# Patient Record
Sex: FEMALE | Race: WHITE
[De-identification: names, ages, dates, MRNs, and addresses within clinical notes are randomized per-mention and may not be internally consistent; named-entity substitution may affect disease eponyms.]

---

## 2017-08-04 ENCOUNTER — HOSPITAL ENCOUNTER (EMERGENCY)
Dept: HOSPITAL 8 - ED | Age: 37
Discharge: HOME | End: 2017-08-04
Payer: COMMERCIAL

## 2017-08-04 VITALS — DIASTOLIC BLOOD PRESSURE: 66 MMHG | SYSTOLIC BLOOD PRESSURE: 106 MMHG

## 2017-08-04 VITALS — BODY MASS INDEX: 20.78 KG/M2 | HEIGHT: 60 IN | WEIGHT: 105.82 LBS

## 2017-08-04 DIAGNOSIS — O20.0: Primary | ICD-10-CM

## 2017-08-04 DIAGNOSIS — Z3A.15: ICD-10-CM

## 2017-08-04 LAB
AST SERPL-CCNC: 15 U/L (ref 15–37)
BUN SERPL-MCNC: 7 MG/DL (ref 7–18)

## 2017-08-04 PROCEDURE — 84702 CHORIONIC GONADOTROPIN TEST: CPT

## 2017-08-04 PROCEDURE — 99285 EMERGENCY DEPT VISIT HI MDM: CPT

## 2017-08-04 PROCEDURE — 80053 COMPREHEN METABOLIC PANEL: CPT

## 2017-08-04 PROCEDURE — 85025 COMPLETE CBC W/AUTO DIFF WBC: CPT

## 2017-08-04 PROCEDURE — 86901 BLOOD TYPING SEROLOGIC RH(D): CPT

## 2017-08-04 PROCEDURE — 36415 COLL VENOUS BLD VENIPUNCTURE: CPT

## 2017-08-04 PROCEDURE — 76805 OB US >/= 14 WKS SNGL FETUS: CPT

## 2017-08-04 PROCEDURE — 81003 URINALYSIS AUTO W/O SCOPE: CPT

## 2018-01-18 ENCOUNTER — HOSPITAL ENCOUNTER (INPATIENT)
Dept: HOSPITAL 8 - LDOP | Age: 38
LOS: 2 days | Discharge: HOME | End: 2018-01-20
Attending: OBSTETRICS & GYNECOLOGY | Admitting: OBSTETRICS & GYNECOLOGY
Payer: COMMERCIAL

## 2018-01-18 VITALS — SYSTOLIC BLOOD PRESSURE: 112 MMHG | DIASTOLIC BLOOD PRESSURE: 70 MMHG

## 2018-01-18 VITALS — SYSTOLIC BLOOD PRESSURE: 127 MMHG | DIASTOLIC BLOOD PRESSURE: 77 MMHG

## 2018-01-18 VITALS — SYSTOLIC BLOOD PRESSURE: 120 MMHG | DIASTOLIC BLOOD PRESSURE: 77 MMHG

## 2018-01-18 VITALS — WEIGHT: 127.01 LBS | HEIGHT: 60 IN | BODY MASS INDEX: 24.94 KG/M2

## 2018-01-18 VITALS — DIASTOLIC BLOOD PRESSURE: 63 MMHG | SYSTOLIC BLOOD PRESSURE: 103 MMHG

## 2018-01-18 DIAGNOSIS — Z91.013: ICD-10-CM

## 2018-01-18 DIAGNOSIS — Z82.49: ICD-10-CM

## 2018-01-18 DIAGNOSIS — O42.92: Primary | ICD-10-CM

## 2018-01-18 DIAGNOSIS — Z3A.38: ICD-10-CM

## 2018-01-18 DIAGNOSIS — Z88.1: ICD-10-CM

## 2018-01-18 DIAGNOSIS — Z83.3: ICD-10-CM

## 2018-01-18 DIAGNOSIS — Z82.62: ICD-10-CM

## 2018-01-18 LAB
BASOPHILS # BLD AUTO: 0.03 X10^3/UL (ref 0–0.1)
BASOPHILS NFR BLD AUTO: 0 % (ref 0–1)
CULTURE INDICATED?: YES
EOSINOPHIL # BLD AUTO: 0.04 X10^3/UL (ref 0–0.4)
EOSINOPHIL NFR BLD AUTO: 0 % (ref 1–7)
ERYTHROCYTE [DISTWIDTH] IN BLOOD BY AUTOMATED COUNT: 13.9 % (ref 9.6–15.2)
LYMPHOCYTES # BLD AUTO: 1.86 X10^3/UL (ref 1–3.4)
LYMPHOCYTES NFR BLD AUTO: 15 % (ref 22–44)
MCH RBC QN AUTO: 31.6 PG (ref 27–34.8)
MCHC RBC AUTO-ENTMCNC: 33.5 G/DL (ref 32.4–35.8)
MCV RBC AUTO: 94.1 FL (ref 80–100)
MD: NO
MICROSCOPIC: (no result)
MONOCYTES # BLD AUTO: 0.46 X10^3/UL (ref 0.2–0.8)
MONOCYTES NFR BLD AUTO: 4 % (ref 2–9)
NEUTROPHILS # BLD AUTO: 10.16 X10^3/UL (ref 1.8–6.8)
NEUTROPHILS NFR BLD AUTO: 81 % (ref 42–75)
PLATELET # BLD AUTO: 181 X10^3/UL (ref 130–400)
PMV BLD AUTO: 9.4 FL (ref 7.4–10.4)
RBC # BLD AUTO: 4.04 X10^6/UL (ref 3.82–5.3)

## 2018-01-18 PROCEDURE — 86900 BLOOD TYPING SEROLOGIC ABO: CPT

## 2018-01-18 PROCEDURE — 00HU33Z INSERTION OF INFUSION DEVICE INTO SPINAL CANAL, PERCUTANEOUS APPROACH: ICD-10-PCS | Performed by: OBSTETRICS & GYNECOLOGY

## 2018-01-18 PROCEDURE — 36415 COLL VENOUS BLD VENIPUNCTURE: CPT

## 2018-01-18 PROCEDURE — 86850 RBC ANTIBODY SCREEN: CPT

## 2018-01-18 PROCEDURE — 0UQMXZZ REPAIR VULVA, EXTERNAL APPROACH: ICD-10-PCS | Performed by: OBSTETRICS & GYNECOLOGY

## 2018-01-18 PROCEDURE — 82803 BLOOD GASES ANY COMBINATION: CPT

## 2018-01-18 PROCEDURE — 3E0R3BZ INTRODUCTION OF ANESTHETIC AGENT INTO SPINAL CANAL, PERCUTANEOUS APPROACH: ICD-10-PCS | Performed by: OBSTETRICS & GYNECOLOGY

## 2018-01-18 PROCEDURE — 87086 URINE CULTURE/COLONY COUNT: CPT

## 2018-01-18 PROCEDURE — 85025 COMPLETE CBC W/AUTO DIFF WBC: CPT

## 2018-01-18 PROCEDURE — 81001 URINALYSIS AUTO W/SCOPE: CPT

## 2018-01-18 PROCEDURE — 0KQM0ZZ REPAIR PERINEUM MUSCLE, OPEN APPROACH: ICD-10-PCS | Performed by: OBSTETRICS & GYNECOLOGY

## 2018-01-18 PROCEDURE — 4A1HXCZ MONITORING OF PRODUCTS OF CONCEPTION, CARDIAC RATE, EXTERNAL APPROACH: ICD-10-PCS | Performed by: OBSTETRICS & GYNECOLOGY

## 2018-01-18 PROCEDURE — 88305 TISSUE EXAM BY PATHOLOGIST: CPT

## 2018-01-18 RX ADMIN — SODIUM CHLORIDE, SODIUM LACTATE, POTASSIUM CHLORIDE, AND CALCIUM CHLORIDE SCH MLS/HR: .6; .31; .03; .02 INJECTION, SOLUTION INTRAVENOUS at 04:27

## 2018-01-18 RX ADMIN — SODIUM CHLORIDE, SODIUM LACTATE, POTASSIUM CHLORIDE, AND CALCIUM CHLORIDE SCH MLS/HR: .6; .31; .03; .02 INJECTION, SOLUTION INTRAVENOUS at 07:33

## 2018-01-18 RX ADMIN — SODIUM CHLORIDE, SODIUM LACTATE, POTASSIUM CHLORIDE, AND CALCIUM CHLORIDE SCH MLS/HR: .6; .31; .03; .02 INJECTION, SOLUTION INTRAVENOUS at 15:00

## 2018-01-18 RX ADMIN — SODIUM CHLORIDE, SODIUM LACTATE, POTASSIUM CHLORIDE, AND CALCIUM CHLORIDE SCH MLS/HR: .6; .31; .03; .02 INJECTION, SOLUTION INTRAVENOUS at 08:51

## 2018-01-18 RX ADMIN — SODIUM CHLORIDE, SODIUM LACTATE, POTASSIUM CHLORIDE, AND CALCIUM CHLORIDE SCH MLS/HR: .6; .31; .03; .02 INJECTION, SOLUTION INTRAVENOUS at 23:00

## 2018-01-19 VITALS — SYSTOLIC BLOOD PRESSURE: 108 MMHG | DIASTOLIC BLOOD PRESSURE: 65 MMHG

## 2018-01-19 VITALS — DIASTOLIC BLOOD PRESSURE: 64 MMHG | SYSTOLIC BLOOD PRESSURE: 104 MMHG

## 2018-01-19 VITALS — SYSTOLIC BLOOD PRESSURE: 104 MMHG | DIASTOLIC BLOOD PRESSURE: 57 MMHG

## 2018-01-19 VITALS — SYSTOLIC BLOOD PRESSURE: 123 MMHG | DIASTOLIC BLOOD PRESSURE: 78 MMHG

## 2018-01-19 VITALS — SYSTOLIC BLOOD PRESSURE: 113 MMHG | DIASTOLIC BLOOD PRESSURE: 74 MMHG

## 2018-01-19 LAB
<PLATELET ESTIMATE>: ADEQUATE
<PLT MORPHOLOGY>: (no result)
ANISOCYTOSIS BLD QL SMEAR: (no result)
BASOPHILS # BLD AUTO: 0.02 X10^3/UL (ref 0–0.1)
BASOPHILS NFR BLD AUTO: 0 % (ref 0–1)
EOSINOPHIL # BLD AUTO: 0.03 X10^3/UL (ref 0–0.4)
EOSINOPHIL NFR BLD AUTO: 0 % (ref 1–7)
ERYTHROCYTE [DISTWIDTH] IN BLOOD BY AUTOMATED COUNT: 14.6 % (ref 9.6–15.2)
LYMPHOCYTES # BLD AUTO: 1.58 X10^3/UL (ref 1–3.4)
LYMPHOCYTES NFR BLD AUTO: 8 % (ref 22–44)
MCH RBC QN AUTO: 32 PG (ref 27–34.8)
MCHC RBC AUTO-ENTMCNC: 33.9 G/DL (ref 32.4–35.8)
MCV RBC AUTO: 94.1 FL (ref 80–100)
MD: (no result)
MONOCYTES # BLD AUTO: 0.77 X10^3/UL (ref 0.2–0.8)
MONOCYTES NFR BLD AUTO: 4 % (ref 2–9)
NEUTROPHILS # BLD AUTO: 16.42 X10^3/UL (ref 1.8–6.8)
NEUTROPHILS NFR BLD AUTO: 87 % (ref 42–75)
PLATELET # BLD AUTO: 160 X10^3/UL (ref 130–400)
PMV BLD AUTO: 9.7 FL (ref 7.4–10.4)
RBC # BLD AUTO: 3.52 X10^6/UL (ref 3.82–5.3)
TOXIC GRAN: (no result)

## 2018-01-19 RX ADMIN — PRENATAL VIT W/ FE FUMARATE-FA TAB 27-0.8 MG SCH EACH: 27-0.8 TAB at 09:17

## 2018-01-19 RX ADMIN — IBUPROFEN PRN MG: 600 TABLET ORAL at 09:17

## 2018-01-19 RX ADMIN — IBUPROFEN PRN MG: 600 TABLET ORAL at 19:51

## 2018-01-19 RX ADMIN — IBUPROFEN PRN MG: 600 TABLET ORAL at 03:22

## 2018-01-19 RX ADMIN — DOCUSATE SODIUM PRN MG: 100 CAPSULE, LIQUID FILLED ORAL at 09:17

## 2018-01-20 VITALS — SYSTOLIC BLOOD PRESSURE: 105 MMHG | DIASTOLIC BLOOD PRESSURE: 69 MMHG

## 2018-01-20 RX ADMIN — DOCUSATE SODIUM PRN MG: 100 CAPSULE, LIQUID FILLED ORAL at 10:33

## 2018-01-20 RX ADMIN — PRENATAL VIT W/ FE FUMARATE-FA TAB 27-0.8 MG SCH EACH: 27-0.8 TAB at 10:33

## 2018-01-20 RX ADMIN — IBUPROFEN PRN MG: 600 TABLET ORAL at 04:22

## 2018-12-24 ENCOUNTER — HOSPITAL ENCOUNTER (OUTPATIENT)
Dept: LACTATION | Facility: MEDICAL CENTER | Age: 38
End: 2018-12-24

## 2018-12-24 NOTE — LACTATION NOTE
Annie came in today because her baby started refusing the breast 2 weeks ago. She is also refusing the bottle and sippy cup. She states that the sitter can get her to take 18oz from the bottle but mom and grandma are unable to get her take it at all.     She is currently pumping 5-6x every 24 hours getting about 9-12oz. She wants to wean off of pumping but wants her to have milk until 1 year.     Plan:   Continue to pump 5-6x times every 24 hours  The week on her birthday just back on one pump and allow her body to adjust a couple of days before she takes out another. Should should be weaned off pumping within 1.5-2 weeks.

## 2020-09-25 ENCOUNTER — HOSPITAL ENCOUNTER (OUTPATIENT)
Facility: MEDICAL CENTER | Age: 40
End: 2020-09-25
Attending: OBSTETRICS & GYNECOLOGY
Payer: COMMERCIAL

## 2020-09-25 LAB
FORWARD REASON: SPWHY: NORMAL
FORWARDED TO LAB: SPWHR: NORMAL
SPECIMEN SENT: SPWT1: NORMAL

## 2020-10-14 ENCOUNTER — HOSPITAL ENCOUNTER (INPATIENT)
Dept: HOSPITAL 8 - LDIP | Age: 40
LOS: 3 days | Discharge: HOME | End: 2020-10-17
Attending: OBSTETRICS & GYNECOLOGY | Admitting: OBSTETRICS & GYNECOLOGY
Payer: COMMERCIAL

## 2020-10-14 VITALS — WEIGHT: 140.21 LBS | BODY MASS INDEX: 27.53 KG/M2 | HEIGHT: 60 IN

## 2020-10-14 VITALS — SYSTOLIC BLOOD PRESSURE: 108 MMHG | DIASTOLIC BLOOD PRESSURE: 69 MMHG

## 2020-10-14 VITALS — SYSTOLIC BLOOD PRESSURE: 108 MMHG | DIASTOLIC BLOOD PRESSURE: 61 MMHG

## 2020-10-14 VITALS — DIASTOLIC BLOOD PRESSURE: 69 MMHG | SYSTOLIC BLOOD PRESSURE: 111 MMHG

## 2020-10-14 VITALS — DIASTOLIC BLOOD PRESSURE: 68 MMHG | SYSTOLIC BLOOD PRESSURE: 108 MMHG

## 2020-10-14 DIAGNOSIS — J30.81: ICD-10-CM

## 2020-10-14 DIAGNOSIS — D64.9: ICD-10-CM

## 2020-10-14 DIAGNOSIS — Z20.828: ICD-10-CM

## 2020-10-14 DIAGNOSIS — O44.03: ICD-10-CM

## 2020-10-14 DIAGNOSIS — O40.3XX0: ICD-10-CM

## 2020-10-14 DIAGNOSIS — Z3A.37: ICD-10-CM

## 2020-10-14 DIAGNOSIS — Z88.8: ICD-10-CM

## 2020-10-14 LAB
BASOPHILS # BLD AUTO: 0 X10^3/UL (ref 0–0.1)
BASOPHILS # BLD AUTO: 0 X10^3/UL (ref 0–0.1)
BASOPHILS NFR BLD AUTO: 0 % (ref 0–1)
BASOPHILS NFR BLD AUTO: 0 % (ref 0–1)
EOSINOPHIL # BLD AUTO: 0 X10^3/UL (ref 0–0.4)
EOSINOPHIL # BLD AUTO: 0.1 X10^3/UL (ref 0–0.4)
EOSINOPHIL NFR BLD AUTO: 0 % (ref 1–7)
EOSINOPHIL NFR BLD AUTO: 1 % (ref 1–7)
ERYTHROCYTE [DISTWIDTH] IN BLOOD BY AUTOMATED COUNT: 15.8 % (ref 9.6–15.2)
ERYTHROCYTE [DISTWIDTH] IN BLOOD BY AUTOMATED COUNT: 16 % (ref 9.6–15.2)
LYMPHOCYTES # BLD AUTO: 1.1 X10^3/UL (ref 1–3.4)
LYMPHOCYTES # BLD AUTO: 1.9 X10^3/UL (ref 1–3.4)
LYMPHOCYTES NFR BLD AUTO: 10 % (ref 22–44)
LYMPHOCYTES NFR BLD AUTO: 22 % (ref 22–44)
MCH RBC QN AUTO: 29.4 PG (ref 27–34.8)
MCH RBC QN AUTO: 29.6 PG (ref 27–34.8)
MCHC RBC AUTO-ENTMCNC: 33.5 G/DL (ref 32.4–35.8)
MCHC RBC AUTO-ENTMCNC: 33.8 G/DL (ref 32.4–35.8)
MD: (no result)
MD: NO
MONOCYTES # BLD AUTO: 0.5 X10^3/UL (ref 0.2–0.8)
MONOCYTES # BLD AUTO: 0.5 X10^3/UL (ref 0.2–0.8)
MONOCYTES NFR BLD AUTO: 4 % (ref 2–9)
MONOCYTES NFR BLD AUTO: 6 % (ref 2–9)
NEUTROPHILS # BLD AUTO: 6.2 X10^3/UL (ref 1.8–6.8)
NEUTROPHILS # BLD AUTO: 9.7 X10^3/UL (ref 1.8–6.8)
NEUTROPHILS NFR BLD AUTO: 71 % (ref 42–75)
NEUTROPHILS NFR BLD AUTO: 86 % (ref 42–75)
PLATELET # BLD AUTO: 177 X10^3/UL (ref 130–400)
PLATELET # BLD AUTO: 191 X10^3/UL (ref 130–400)
PMV BLD AUTO: 8.7 FL (ref 7.4–10.4)
PMV BLD AUTO: 9 FL (ref 7.4–10.4)
RBC # BLD AUTO: 3.12 X10^6/UL (ref 3.82–5.3)
RBC # BLD AUTO: 3.74 X10^6/UL (ref 3.82–5.3)

## 2020-10-14 PROCEDURE — 87635 SARS-COV-2 COVID-19 AMP PRB: CPT

## 2020-10-14 PROCEDURE — 86850 RBC ANTIBODY SCREEN: CPT

## 2020-10-14 PROCEDURE — 85025 COMPLETE CBC W/AUTO DIFF WBC: CPT

## 2020-10-14 PROCEDURE — 86592 SYPHILIS TEST NON-TREP QUAL: CPT

## 2020-10-14 PROCEDURE — 36415 COLL VENOUS BLD VENIPUNCTURE: CPT

## 2020-10-14 PROCEDURE — 86923 COMPATIBILITY TEST ELECTRIC: CPT

## 2020-10-14 PROCEDURE — 86900 BLOOD TYPING SEROLOGIC ABO: CPT

## 2020-10-14 RX ADMIN — SIMETHICONE PRN MG: 80 TABLET, CHEWABLE ORAL at 17:01

## 2020-10-14 RX ADMIN — OXYCODONE HYDROCHLORIDE AND ACETAMINOPHEN PRN TAB: 5; 325 TABLET ORAL at 12:54

## 2020-10-14 RX ADMIN — IBUPROFEN PRN MG: 600 TABLET ORAL at 20:10

## 2020-10-14 RX ADMIN — Medication SCH MLS/HR: at 19:30

## 2020-10-14 RX ADMIN — OXYCODONE HYDROCHLORIDE AND ACETAMINOPHEN PRN TAB: 5; 325 TABLET ORAL at 22:03

## 2020-10-14 RX ADMIN — OXYCODONE HYDROCHLORIDE AND ACETAMINOPHEN PRN TAB: 5; 325 TABLET ORAL at 17:00

## 2020-10-14 RX ADMIN — DOCUSATE SODIUM PRN MG: 100 CAPSULE, LIQUID FILLED ORAL at 20:10

## 2020-10-14 RX ADMIN — SODIUM CHLORIDE, SODIUM LACTATE, POTASSIUM CHLORIDE, AND CALCIUM CHLORIDE SCH MLS/HR: .6; .31; .03; .02 INJECTION, SOLUTION INTRAVENOUS at 09:38

## 2020-10-14 RX ADMIN — SODIUM CHLORIDE, SODIUM LACTATE, POTASSIUM CHLORIDE, AND CALCIUM CHLORIDE SCH MLS/HR: .6; .31; .03; .02 INJECTION, SOLUTION INTRAVENOUS at 19:30

## 2020-10-14 RX ADMIN — SODIUM CHLORIDE, SODIUM LACTATE, POTASSIUM CHLORIDE, AND CALCIUM CHLORIDE SCH MLS/HR: .6; .31; .03; .02 INJECTION, SOLUTION INTRAVENOUS at 17:30

## 2020-10-14 RX ADMIN — Medication SCH MLS/HR: at 09:38

## 2020-10-14 RX ADMIN — SODIUM CHLORIDE, SODIUM LACTATE, POTASSIUM CHLORIDE, AND CALCIUM CHLORIDE SCH MLS/HR: .6; .31; .03; .02 INJECTION, SOLUTION INTRAVENOUS at 09:30

## 2020-10-15 VITALS — DIASTOLIC BLOOD PRESSURE: 54 MMHG | SYSTOLIC BLOOD PRESSURE: 92 MMHG

## 2020-10-15 VITALS — SYSTOLIC BLOOD PRESSURE: 90 MMHG | DIASTOLIC BLOOD PRESSURE: 45 MMHG

## 2020-10-15 VITALS — SYSTOLIC BLOOD PRESSURE: 94 MMHG | DIASTOLIC BLOOD PRESSURE: 53 MMHG

## 2020-10-15 VITALS — DIASTOLIC BLOOD PRESSURE: 64 MMHG | SYSTOLIC BLOOD PRESSURE: 100 MMHG

## 2020-10-15 RX ADMIN — DOCUSATE SODIUM PRN MG: 100 CAPSULE, LIQUID FILLED ORAL at 21:11

## 2020-10-15 RX ADMIN — SODIUM CHLORIDE, SODIUM LACTATE, POTASSIUM CHLORIDE, AND CALCIUM CHLORIDE SCH MLS/HR: .6; .31; .03; .02 INJECTION, SOLUTION INTRAVENOUS at 05:30

## 2020-10-15 RX ADMIN — Medication SCH MLS/HR: at 15:30

## 2020-10-15 RX ADMIN — IBUPROFEN PRN MG: 600 TABLET ORAL at 02:30

## 2020-10-15 RX ADMIN — IBUPROFEN PRN MG: 600 TABLET ORAL at 14:41

## 2020-10-15 RX ADMIN — OXYCODONE HYDROCHLORIDE AND ACETAMINOPHEN PRN TAB: 5; 325 TABLET ORAL at 07:49

## 2020-10-15 RX ADMIN — SIMETHICONE PRN MG: 80 TABLET, CHEWABLE ORAL at 14:41

## 2020-10-15 RX ADMIN — PRENATAL VIT W/ FE FUMARATE-FA TAB 27-0.8 MG SCH EACH: 27-0.8 TAB at 07:50

## 2020-10-15 RX ADMIN — IBUPROFEN PRN MG: 600 TABLET ORAL at 21:11

## 2020-10-15 RX ADMIN — OXYCODONE HYDROCHLORIDE AND ACETAMINOPHEN PRN TAB: 5; 325 TABLET ORAL at 17:11

## 2020-10-15 RX ADMIN — SODIUM CHLORIDE, SODIUM LACTATE, POTASSIUM CHLORIDE, AND CALCIUM CHLORIDE SCH MLS/HR: .6; .31; .03; .02 INJECTION, SOLUTION INTRAVENOUS at 15:30

## 2020-10-15 RX ADMIN — DOCUSATE SODIUM PRN MG: 100 CAPSULE, LIQUID FILLED ORAL at 07:50

## 2020-10-15 RX ADMIN — Medication SCH MLS/HR: at 05:30

## 2020-10-15 RX ADMIN — OXYCODONE HYDROCHLORIDE AND ACETAMINOPHEN PRN TAB: 5; 325 TABLET ORAL at 02:30

## 2020-10-15 RX ADMIN — SODIUM CHLORIDE, SODIUM LACTATE, POTASSIUM CHLORIDE, AND CALCIUM CHLORIDE SCH MLS/HR: .6; .31; .03; .02 INJECTION, SOLUTION INTRAVENOUS at 01:30

## 2020-10-15 RX ADMIN — OXYCODONE HYDROCHLORIDE AND ACETAMINOPHEN PRN TAB: 5; 325 TABLET ORAL at 21:11

## 2020-10-15 RX ADMIN — SODIUM CHLORIDE, SODIUM LACTATE, POTASSIUM CHLORIDE, AND CALCIUM CHLORIDE SCH MLS/HR: .6; .31; .03; .02 INJECTION, SOLUTION INTRAVENOUS at 17:30

## 2020-10-15 RX ADMIN — IBUPROFEN PRN MG: 600 TABLET ORAL at 08:36

## 2020-10-15 RX ADMIN — SIMETHICONE PRN MG: 80 TABLET, CHEWABLE ORAL at 07:49

## 2020-10-15 RX ADMIN — OXYCODONE HYDROCHLORIDE AND ACETAMINOPHEN PRN TAB: 5; 325 TABLET ORAL at 12:44

## 2020-10-15 RX ADMIN — SODIUM CHLORIDE, SODIUM LACTATE, POTASSIUM CHLORIDE, AND CALCIUM CHLORIDE SCH MLS/HR: .6; .31; .03; .02 INJECTION, SOLUTION INTRAVENOUS at 09:30

## 2020-10-16 VITALS — SYSTOLIC BLOOD PRESSURE: 99 MMHG | DIASTOLIC BLOOD PRESSURE: 62 MMHG

## 2020-10-16 VITALS — SYSTOLIC BLOOD PRESSURE: 103 MMHG | DIASTOLIC BLOOD PRESSURE: 61 MMHG

## 2020-10-16 RX ADMIN — PRENATAL VIT W/ FE FUMARATE-FA TAB 27-0.8 MG SCH EACH: 27-0.8 TAB at 07:28

## 2020-10-16 RX ADMIN — Medication SCH MLS/HR: at 01:30

## 2020-10-16 RX ADMIN — SODIUM CHLORIDE, SODIUM LACTATE, POTASSIUM CHLORIDE, AND CALCIUM CHLORIDE SCH MLS/HR: .6; .31; .03; .02 INJECTION, SOLUTION INTRAVENOUS at 21:30

## 2020-10-16 RX ADMIN — OXYCODONE HYDROCHLORIDE AND ACETAMINOPHEN PRN TAB: 5; 325 TABLET ORAL at 22:47

## 2020-10-16 RX ADMIN — OXYCODONE HYDROCHLORIDE AND ACETAMINOPHEN PRN TAB: 5; 325 TABLET ORAL at 17:42

## 2020-10-16 RX ADMIN — IBUPROFEN PRN MG: 600 TABLET ORAL at 08:26

## 2020-10-16 RX ADMIN — OXYCODONE HYDROCHLORIDE AND ACETAMINOPHEN PRN TAB: 5; 325 TABLET ORAL at 11:42

## 2020-10-16 RX ADMIN — IBUPROFEN PRN MG: 600 TABLET ORAL at 16:51

## 2020-10-16 RX ADMIN — IBUPROFEN PRN MG: 600 TABLET ORAL at 02:00

## 2020-10-16 RX ADMIN — OXYCODONE HYDROCHLORIDE AND ACETAMINOPHEN PRN TAB: 5; 325 TABLET ORAL at 07:29

## 2020-10-16 RX ADMIN — SODIUM CHLORIDE, SODIUM LACTATE, POTASSIUM CHLORIDE, AND CALCIUM CHLORIDE SCH MLS/HR: .6; .31; .03; .02 INJECTION, SOLUTION INTRAVENOUS at 01:30

## 2020-10-16 RX ADMIN — SODIUM CHLORIDE, SODIUM LACTATE, POTASSIUM CHLORIDE, AND CALCIUM CHLORIDE SCH MLS/HR: .6; .31; .03; .02 INJECTION, SOLUTION INTRAVENOUS at 02:56

## 2020-10-16 RX ADMIN — Medication SCH MLS/HR: at 02:57

## 2020-10-16 RX ADMIN — SIMETHICONE PRN MG: 80 TABLET, CHEWABLE ORAL at 07:28

## 2020-10-16 RX ADMIN — IBUPROFEN PRN MG: 600 TABLET ORAL at 23:55

## 2020-10-16 RX ADMIN — DOCUSATE SODIUM PRN MG: 100 CAPSULE, LIQUID FILLED ORAL at 07:28

## 2020-10-16 RX ADMIN — DOCUSATE SODIUM PRN MG: 100 CAPSULE, LIQUID FILLED ORAL at 19:19

## 2020-10-16 RX ADMIN — OXYCODONE HYDROCHLORIDE AND ACETAMINOPHEN PRN TAB: 5; 325 TABLET ORAL at 02:01

## 2020-10-17 VITALS — SYSTOLIC BLOOD PRESSURE: 106 MMHG | DIASTOLIC BLOOD PRESSURE: 53 MMHG

## 2020-10-17 RX ADMIN — FERROUS GLUCONATE TAB 324 MG (37.5 MG ELEMENTAL IRON) SCH MG: 324 (37.5 FE) TAB at 17:15

## 2020-10-17 RX ADMIN — OXYCODONE HYDROCHLORIDE AND ACETAMINOPHEN PRN TAB: 5; 325 TABLET ORAL at 12:03

## 2020-10-17 RX ADMIN — IBUPROFEN PRN MG: 600 TABLET ORAL at 12:02

## 2020-10-17 RX ADMIN — FERROUS GLUCONATE TAB 324 MG (37.5 MG ELEMENTAL IRON) SCH MG: 324 (37.5 FE) TAB at 07:44

## 2020-10-17 RX ADMIN — PRENATAL VIT W/ FE FUMARATE-FA TAB 27-0.8 MG SCH EACH: 27-0.8 TAB at 07:44

## 2020-10-17 RX ADMIN — DOCUSATE SODIUM PRN MG: 100 CAPSULE, LIQUID FILLED ORAL at 07:44

## 2020-10-17 RX ADMIN — SODIUM CHLORIDE, SODIUM LACTATE, POTASSIUM CHLORIDE, AND CALCIUM CHLORIDE SCH MLS/HR: .6; .31; .03; .02 INJECTION, SOLUTION INTRAVENOUS at 03:05

## 2020-10-17 RX ADMIN — SODIUM CHLORIDE, SODIUM LACTATE, POTASSIUM CHLORIDE, AND CALCIUM CHLORIDE SCH MLS/HR: .6; .31; .03; .02 INJECTION, SOLUTION INTRAVENOUS at 03:06

## 2020-10-17 RX ADMIN — IBUPROFEN PRN MG: 600 TABLET ORAL at 06:14

## 2020-10-17 RX ADMIN — IBUPROFEN PRN MG: 600 TABLET ORAL at 17:51

## 2020-10-17 RX ADMIN — SIMETHICONE PRN MG: 80 TABLET, CHEWABLE ORAL at 07:44

## 2020-10-17 RX ADMIN — OXYCODONE HYDROCHLORIDE AND ACETAMINOPHEN PRN TAB: 5; 325 TABLET ORAL at 16:17

## 2020-10-17 RX ADMIN — FERROUS GLUCONATE TAB 324 MG (37.5 MG ELEMENTAL IRON) SCH MG: 324 (37.5 FE) TAB at 12:02

## 2020-10-17 RX ADMIN — OXYCODONE HYDROCHLORIDE AND ACETAMINOPHEN PRN TAB: 5; 325 TABLET ORAL at 07:45

## 2020-10-17 RX ADMIN — OXYCODONE HYDROCHLORIDE AND ACETAMINOPHEN PRN TAB: 5; 325 TABLET ORAL at 03:38

## 2020-10-17 RX ADMIN — SODIUM CHLORIDE, SODIUM LACTATE, POTASSIUM CHLORIDE, AND CALCIUM CHLORIDE SCH MLS/HR: .6; .31; .03; .02 INJECTION, SOLUTION INTRAVENOUS at 02:03

## 2021-03-12 ENCOUNTER — HOSPITAL ENCOUNTER (OUTPATIENT)
Dept: LAB | Facility: MEDICAL CENTER | Age: 41
End: 2021-03-12
Attending: OBSTETRICS & GYNECOLOGY
Payer: COMMERCIAL

## 2021-03-12 LAB
GLUCOSE 1.5H P CHAL SERPL-MCNC: 139 MG/DL (ref 65–139)
GLUCOSE 1H P CHAL SERPL-MCNC: 146 MG/DL (ref 65–199)
GLUCOSE 2H P CHAL SERPL-MCNC: 141 MG/DL (ref 65–139)
GLUCOSE 30M P CHAL SERPL-MCNC: 118 MG/DL (ref 65–199)
GLUCOSE BS SERPL-MCNC: 72 MG/DL (ref 65–99)

## 2021-03-12 PROCEDURE — 36415 COLL VENOUS BLD VENIPUNCTURE: CPT

## 2021-03-12 PROCEDURE — 82952 GTT-ADDED SAMPLES: CPT

## 2021-03-12 PROCEDURE — 82951 GLUCOSE TOLERANCE TEST (GTT): CPT

## 2022-01-03 ENCOUNTER — HOSPITAL ENCOUNTER (OUTPATIENT)
Dept: LAB | Facility: MEDICAL CENTER | Age: 42
End: 2022-01-03
Attending: FAMILY MEDICINE
Payer: COMMERCIAL

## 2022-01-03 LAB
ALBUMIN SERPL BCP-MCNC: 4.7 G/DL (ref 3.2–4.9)
ALBUMIN/GLOB SERPL: 1.7 G/DL
ALP SERPL-CCNC: 82 U/L (ref 30–99)
ALT SERPL-CCNC: 19 U/L (ref 2–50)
ANION GAP SERPL CALC-SCNC: 14 MMOL/L (ref 7–16)
AST SERPL-CCNC: 17 U/L (ref 12–45)
BILIRUB SERPL-MCNC: 0.2 MG/DL (ref 0.1–1.5)
BUN SERPL-MCNC: 23 MG/DL (ref 8–22)
CALCIUM SERPL-MCNC: 9.5 MG/DL (ref 8.5–10.5)
CHLORIDE SERPL-SCNC: 101 MMOL/L (ref 96–112)
CHOLEST SERPL-MCNC: 246 MG/DL (ref 100–199)
CO2 SERPL-SCNC: 23 MMOL/L (ref 20–33)
CREAT SERPL-MCNC: 0.72 MG/DL (ref 0.5–1.4)
FASTING STATUS PATIENT QL REPORTED: NORMAL
GLOBULIN SER CALC-MCNC: 2.8 G/DL (ref 1.9–3.5)
GLUCOSE SERPL-MCNC: 92 MG/DL (ref 65–99)
HDLC SERPL-MCNC: 43 MG/DL
LDLC SERPL CALC-MCNC: 142 MG/DL
POTASSIUM SERPL-SCNC: 4.1 MMOL/L (ref 3.6–5.5)
PROT SERPL-MCNC: 7.5 G/DL (ref 6–8.2)
SODIUM SERPL-SCNC: 138 MMOL/L (ref 135–145)
TRIGL SERPL-MCNC: 305 MG/DL (ref 0–149)
TSH SERPL DL<=0.005 MIU/L-ACNC: 1.19 UIU/ML (ref 0.38–5.33)

## 2022-01-03 PROCEDURE — 80061 LIPID PANEL: CPT

## 2022-01-03 PROCEDURE — 84443 ASSAY THYROID STIM HORMONE: CPT

## 2022-01-03 PROCEDURE — 36415 COLL VENOUS BLD VENIPUNCTURE: CPT

## 2022-01-03 PROCEDURE — 80053 COMPREHEN METABOLIC PANEL: CPT

## 2023-02-21 ENCOUNTER — HOSPITAL ENCOUNTER (OUTPATIENT)
Dept: LAB | Facility: MEDICAL CENTER | Age: 43
End: 2023-02-21
Attending: FAMILY MEDICINE
Payer: COMMERCIAL

## 2023-02-21 ENCOUNTER — HOSPITAL ENCOUNTER (OUTPATIENT)
Dept: LAB | Facility: MEDICAL CENTER | Age: 43
End: 2023-02-21
Attending: OBSTETRICS & GYNECOLOGY
Payer: COMMERCIAL

## 2023-02-21 LAB
ALBUMIN SERPL BCP-MCNC: 4.5 G/DL (ref 3.2–4.9)
ALBUMIN/GLOB SERPL: 1.6 G/DL
ALP SERPL-CCNC: 57 U/L (ref 30–99)
ALT SERPL-CCNC: 14 U/L (ref 2–50)
ANION GAP SERPL CALC-SCNC: 10 MMOL/L (ref 7–16)
AST SERPL-CCNC: 16 U/L (ref 12–45)
BASOPHILS # BLD AUTO: 0.4 % (ref 0–1.8)
BASOPHILS # BLD: 0.02 K/UL (ref 0–0.12)
BILIRUB SERPL-MCNC: 0.4 MG/DL (ref 0.1–1.5)
BUN SERPL-MCNC: 16 MG/DL (ref 8–22)
CALCIUM ALBUM COR SERPL-MCNC: 8.8 MG/DL (ref 8.5–10.5)
CALCIUM SERPL-MCNC: 9.2 MG/DL (ref 8.5–10.5)
CHLORIDE SERPL-SCNC: 99 MMOL/L (ref 96–112)
CHOLEST SERPL-MCNC: 251 MG/DL (ref 100–199)
CO2 SERPL-SCNC: 25 MMOL/L (ref 20–33)
CREAT SERPL-MCNC: 0.74 MG/DL (ref 0.5–1.4)
EOSINOPHIL # BLD AUTO: 0.07 K/UL (ref 0–0.51)
EOSINOPHIL NFR BLD: 1.2 % (ref 0–6.9)
ERYTHROCYTE [DISTWIDTH] IN BLOOD BY AUTOMATED COUNT: 40.5 FL (ref 35.9–50)
EST. AVERAGE GLUCOSE BLD GHB EST-MCNC: 111 MG/DL
EST. AVERAGE GLUCOSE BLD GHB EST-MCNC: 117 MG/DL
FASTING STATUS PATIENT QL REPORTED: NORMAL
GFR SERPLBLD CREATININE-BSD FMLA CKD-EPI: 104 ML/MIN/1.73 M 2
GLOBULIN SER CALC-MCNC: 2.9 G/DL (ref 1.9–3.5)
GLUCOSE SERPL-MCNC: 88 MG/DL (ref 65–99)
HBA1C MFR BLD: 5.5 % (ref 4–5.6)
HBA1C MFR BLD: 5.7 % (ref 4–5.6)
HCT VFR BLD AUTO: 40.3 % (ref 37–47)
HDLC SERPL-MCNC: 36 MG/DL
HGB BLD-MCNC: 13.8 G/DL (ref 12–16)
IMM GRANULOCYTES # BLD AUTO: 0.02 K/UL (ref 0–0.11)
IMM GRANULOCYTES NFR BLD AUTO: 0.4 % (ref 0–0.9)
LDLC SERPL CALC-MCNC: 188 MG/DL
LYMPHOCYTES # BLD AUTO: 2.26 K/UL (ref 1–4.8)
LYMPHOCYTES NFR BLD: 40.3 % (ref 22–41)
MCH RBC QN AUTO: 30.3 PG (ref 27–33)
MCHC RBC AUTO-ENTMCNC: 34.2 G/DL (ref 33.6–35)
MCV RBC AUTO: 88.6 FL (ref 81.4–97.8)
MONOCYTES # BLD AUTO: 0.26 K/UL (ref 0–0.85)
MONOCYTES NFR BLD AUTO: 4.6 % (ref 0–13.4)
NEUTROPHILS # BLD AUTO: 2.98 K/UL (ref 2–7.15)
NEUTROPHILS NFR BLD: 53.1 % (ref 44–72)
NRBC # BLD AUTO: 0 K/UL
NRBC BLD-RTO: 0 /100 WBC
PLATELET # BLD AUTO: 247 K/UL (ref 164–446)
PMV BLD AUTO: 10.7 FL (ref 9–12.9)
POTASSIUM SERPL-SCNC: 3.6 MMOL/L (ref 3.6–5.5)
PROLACTIN SERPL-MCNC: 6.8 NG/ML (ref 2.8–26)
PROT SERPL-MCNC: 7.4 G/DL (ref 6–8.2)
RBC # BLD AUTO: 4.55 M/UL (ref 4.2–5.4)
SODIUM SERPL-SCNC: 134 MMOL/L (ref 135–145)
TRIGL SERPL-MCNC: 135 MG/DL (ref 0–149)
WBC # BLD AUTO: 5.6 K/UL (ref 4.8–10.8)

## 2023-02-21 PROCEDURE — 86803 HEPATITIS C AB TEST: CPT

## 2023-02-21 PROCEDURE — 86780 TREPONEMA PALLIDUM: CPT

## 2023-02-21 PROCEDURE — 83036 HEMOGLOBIN GLYCOSYLATED A1C: CPT

## 2023-02-21 PROCEDURE — 83520 IMMUNOASSAY QUANT NOS NONAB: CPT

## 2023-02-21 PROCEDURE — 87491 CHLMYD TRACH DNA AMP PROBE: CPT

## 2023-02-21 PROCEDURE — 84146 ASSAY OF PROLACTIN: CPT

## 2023-02-21 PROCEDURE — 80061 LIPID PANEL: CPT

## 2023-02-21 PROCEDURE — 87389 HIV-1 AG W/HIV-1&-2 AB AG IA: CPT

## 2023-02-21 PROCEDURE — 86706 HEP B SURFACE ANTIBODY: CPT

## 2023-02-21 PROCEDURE — 36415 COLL VENOUS BLD VENIPUNCTURE: CPT

## 2023-02-21 PROCEDURE — 84443 ASSAY THYROID STIM HORMONE: CPT

## 2023-02-21 PROCEDURE — 80053 COMPREHEN METABOLIC PANEL: CPT

## 2023-02-21 PROCEDURE — 87591 N.GONORRHOEAE DNA AMP PROB: CPT

## 2023-02-21 PROCEDURE — 85025 COMPLETE CBC W/AUTO DIFF WBC: CPT

## 2023-02-21 PROCEDURE — 87340 HEPATITIS B SURFACE AG IA: CPT

## 2023-02-21 PROCEDURE — 83036 HEMOGLOBIN GLYCOSYLATED A1C: CPT | Mod: 91

## 2023-02-22 LAB
HBV SURFACE AB SERPL IA-ACNC: 60.7 MIU/ML (ref 0–10)
HBV SURFACE AG SER QL: NORMAL
HCV AB SER QL: NORMAL
HIV 1+2 AB+HIV1 P24 AG SERPL QL IA: NORMAL
T PALLIDUM AB SER QL IA: NORMAL
TSH SERPL DL<=0.005 MIU/L-ACNC: 0.98 UIU/ML (ref 0.38–5.33)

## 2023-02-24 LAB — MIS SERPL-MCNC: 4.06 NG/ML

## 2023-02-26 LAB
C TRACH DNA SPEC QL NAA+PROBE: NEGATIVE
N GONORRHOEA DNA SPEC QL NAA+PROBE: NEGATIVE
SPECIMEN SOURCE: NORMAL

## 2023-04-03 ENCOUNTER — HOSPITAL ENCOUNTER (OUTPATIENT)
Dept: RADIOLOGY | Facility: MEDICAL CENTER | Age: 43
End: 2023-04-03
Attending: FAMILY MEDICINE
Payer: COMMERCIAL

## 2023-04-03 DIAGNOSIS — Z12.31 VISIT FOR SCREENING MAMMOGRAM: ICD-10-CM

## 2023-04-03 PROCEDURE — 77063 BREAST TOMOSYNTHESIS BI: CPT

## 2023-04-06 ENCOUNTER — HOSPITAL ENCOUNTER (OUTPATIENT)
Dept: RADIOLOGY | Facility: MEDICAL CENTER | Age: 43
End: 2023-04-06
Attending: FAMILY MEDICINE
Payer: COMMERCIAL

## 2023-04-06 DIAGNOSIS — R92.8 ABNORMAL MAMMOGRAM: ICD-10-CM

## 2023-04-06 PROCEDURE — 77065 DX MAMMO INCL CAD UNI: CPT | Mod: LT

## 2023-04-14 ENCOUNTER — HOSPITAL ENCOUNTER (OUTPATIENT)
Dept: RADIOLOGY | Facility: MEDICAL CENTER | Age: 43
End: 2023-04-14
Attending: FAMILY MEDICINE
Payer: COMMERCIAL

## 2023-04-14 DIAGNOSIS — R92.8 ABNORMAL FINDINGS ON DIAGNOSTIC IMAGING OF BREAST: ICD-10-CM

## 2023-04-14 LAB — PATHOLOGY CONSULT NOTE: NORMAL

## 2023-04-14 PROCEDURE — 88305 TISSUE EXAM BY PATHOLOGIST: CPT

## 2023-04-14 PROCEDURE — 77065 DX MAMMO INCL CAD UNI: CPT | Mod: LT

## 2023-04-17 ENCOUNTER — TELEPHONE (OUTPATIENT)
Dept: RADIOLOGY | Facility: MEDICAL CENTER | Age: 43
End: 2023-04-17
Payer: COMMERCIAL

## 2023-08-04 ENCOUNTER — HOSPITAL ENCOUNTER (OUTPATIENT)
Dept: LAB | Facility: MEDICAL CENTER | Age: 43
End: 2023-08-04
Attending: OBSTETRICS & GYNECOLOGY
Payer: COMMERCIAL

## 2023-08-04 LAB — B-HCG SERPL-ACNC: 5727 MIU/ML (ref 0–5)

## 2023-08-04 PROCEDURE — 36415 COLL VENOUS BLD VENIPUNCTURE: CPT

## 2023-08-04 PROCEDURE — 84702 CHORIONIC GONADOTROPIN TEST: CPT

## 2023-09-11 ENCOUNTER — HOSPITAL ENCOUNTER (OUTPATIENT)
Dept: LAB | Facility: MEDICAL CENTER | Age: 43
End: 2023-09-11
Attending: OBSTETRICS & GYNECOLOGY
Payer: COMMERCIAL

## 2023-09-11 PROCEDURE — 85303 CLOT INHIBIT PROT C ACTIVITY: CPT

## 2023-09-11 PROCEDURE — 81291 MTHFR GENE: CPT

## 2023-09-11 PROCEDURE — 36415 COLL VENOUS BLD VENIPUNCTURE: CPT

## 2023-09-11 PROCEDURE — 85300 ANTITHROMBIN III ACTIVITY: CPT

## 2023-09-11 PROCEDURE — 85306 CLOT INHIBIT PROT S FREE: CPT

## 2023-09-11 PROCEDURE — 81241 F5 GENE: CPT

## 2023-09-11 PROCEDURE — 85210 CLOT FACTOR II PROTHROM SPEC: CPT

## 2023-09-11 PROCEDURE — 85301 ANTITHROMBIN III ANTIGEN: CPT

## 2023-09-13 LAB — PROT S FREE AG ACT/NOR PPP IA: 107 % (ref 55–123)

## 2023-09-14 LAB
AT III ACT/NOR PPP CHRO: 104 % (ref 76–128)
AT III AG ACT/NOR PPP IA: 100 % (ref 82–136)
PROT C ACT/NOR PPP: 127 % (ref 83–168)
PROTHROM ACT/NOR PPP: 104 % (ref 86–150)

## 2023-09-15 LAB
MTHFR C.1298A>C GENO BLD/T: NEGATIVE
MTHFR C.677C>T GENO BLD/T: NORMAL
MTHFR GENE MUT ANL BLD/T: NORMAL

## 2023-09-16 LAB — F5 P.R506Q BLD/T QL: NEGATIVE

## 2023-12-29 ENCOUNTER — HOSPITAL ENCOUNTER (OUTPATIENT)
Facility: MEDICAL CENTER | Age: 43
End: 2023-12-29
Attending: OBSTETRICS & GYNECOLOGY
Payer: COMMERCIAL

## 2023-12-29 LAB
PROLACTIN SERPL-MCNC: 21.7 NG/ML (ref 2.8–26)
TSH SERPL DL<=0.005 MIU/L-ACNC: 1.77 UIU/ML (ref 0.38–5.33)

## 2023-12-29 PROCEDURE — 84146 ASSAY OF PROLACTIN: CPT

## 2023-12-29 PROCEDURE — 84443 ASSAY THYROID STIM HORMONE: CPT

## 2025-03-20 ENCOUNTER — HOSPITAL ENCOUNTER (OUTPATIENT)
Dept: LAB | Facility: MEDICAL CENTER | Age: 45
End: 2025-03-20
Attending: FAMILY MEDICINE
Payer: COMMERCIAL

## 2025-03-20 LAB
ALBUMIN SERPL BCP-MCNC: 4.5 G/DL (ref 3.2–4.9)
ALBUMIN/GLOB SERPL: 1.5 G/DL
ALP SERPL-CCNC: 61 U/L (ref 30–99)
ALT SERPL-CCNC: 12 U/L (ref 2–50)
ANION GAP SERPL CALC-SCNC: 10 MMOL/L (ref 7–16)
AST SERPL-CCNC: 22 U/L (ref 12–45)
BILIRUB SERPL-MCNC: 0.4 MG/DL (ref 0.1–1.5)
BUN SERPL-MCNC: 21 MG/DL (ref 8–22)
CALCIUM ALBUM COR SERPL-MCNC: 9.1 MG/DL (ref 8.5–10.5)
CALCIUM SERPL-MCNC: 9.5 MG/DL (ref 8.5–10.5)
CHLORIDE SERPL-SCNC: 103 MMOL/L (ref 96–112)
CHOLEST SERPL-MCNC: 212 MG/DL (ref 100–199)
CO2 SERPL-SCNC: 26 MMOL/L (ref 20–33)
CREAT SERPL-MCNC: 0.78 MG/DL (ref 0.5–1.4)
FASTING STATUS PATIENT QL REPORTED: NORMAL
GFR SERPLBLD CREATININE-BSD FMLA CKD-EPI: 97 ML/MIN/1.73 M 2
GLOBULIN SER CALC-MCNC: 3 G/DL (ref 1.9–3.5)
GLUCOSE SERPL-MCNC: 95 MG/DL (ref 65–99)
HDLC SERPL-MCNC: 38 MG/DL
LDLC SERPL CALC-MCNC: 118 MG/DL
POTASSIUM SERPL-SCNC: 3.9 MMOL/L (ref 3.6–5.5)
PROT SERPL-MCNC: 7.5 G/DL (ref 6–8.2)
SODIUM SERPL-SCNC: 139 MMOL/L (ref 135–145)
TRIGL SERPL-MCNC: 281 MG/DL (ref 0–149)
TSH SERPL-ACNC: 1.09 UIU/ML (ref 0.35–5.5)

## 2025-03-20 PROCEDURE — 36415 COLL VENOUS BLD VENIPUNCTURE: CPT

## 2025-03-20 PROCEDURE — 80061 LIPID PANEL: CPT

## 2025-03-20 PROCEDURE — 80053 COMPREHEN METABOLIC PANEL: CPT

## 2025-03-20 PROCEDURE — 84443 ASSAY THYROID STIM HORMONE: CPT

## 2025-06-16 ENCOUNTER — HOSPITAL ENCOUNTER (OUTPATIENT)
Dept: CARDIOLOGY | Facility: MEDICAL CENTER | Age: 45
End: 2025-06-16
Attending: FAMILY MEDICINE
Payer: COMMERCIAL

## 2025-06-16 DIAGNOSIS — R07.89 OTHER CHEST PAIN: ICD-10-CM

## 2025-06-16 LAB
LV EJECT FRACT  99904: 59
LV EJECT FRACT MOD 2C 99903: 51.26
LV EJECT FRACT MOD 4C 99902: 65
LV EJECT FRACT MOD BP 99901: 58.61

## 2025-06-16 PROCEDURE — 93306 TTE W/DOPPLER COMPLETE: CPT | Mod: 26 | Performed by: INTERNAL MEDICINE

## 2025-06-16 PROCEDURE — 93356 MYOCRD STRAIN IMG SPCKL TRCK: CPT | Performed by: INTERNAL MEDICINE

## 2025-06-16 PROCEDURE — 93356 MYOCRD STRAIN IMG SPCKL TRCK: CPT

## 2025-06-23 ENCOUNTER — HOSPITAL ENCOUNTER (OUTPATIENT)
Dept: RADIOLOGY | Facility: MEDICAL CENTER | Age: 45
End: 2025-06-23
Attending: FAMILY MEDICINE
Payer: COMMERCIAL

## 2025-06-23 DIAGNOSIS — R07.89 OTHER CHEST PAIN: ICD-10-CM

## 2025-06-23 PROCEDURE — A9502 TC99M TETROFOSMIN: HCPCS

## 2025-07-16 ENCOUNTER — HOSPITAL ENCOUNTER (EMERGENCY)
Facility: MEDICAL CENTER | Age: 45
End: 2025-07-17
Attending: STUDENT IN AN ORGANIZED HEALTH CARE EDUCATION/TRAINING PROGRAM
Payer: COMMERCIAL

## 2025-07-16 DIAGNOSIS — J18.9 PNEUMONIA OF RIGHT LUNG DUE TO INFECTIOUS ORGANISM, UNSPECIFIED PART OF LUNG: Primary | ICD-10-CM

## 2025-07-16 DIAGNOSIS — R11.0 NAUSEA: ICD-10-CM

## 2025-07-16 LAB
FLUAV RNA SPEC QL NAA+PROBE: NEGATIVE
FLUBV RNA SPEC QL NAA+PROBE: NEGATIVE
RSV RNA SPEC QL NAA+PROBE: NEGATIVE
SARS-COV-2 RNA RESP QL NAA+PROBE: NEGATIVE

## 2025-07-16 PROCEDURE — 0241U POC COV-2, FLU A/B, RSV BY PCR: CPT

## 2025-07-16 PROCEDURE — 99283 EMERGENCY DEPT VISIT LOW MDM: CPT

## 2025-07-16 NOTE — Clinical Note
Annie Tierney was seen and treated in our emergency department on 7/16/2025.  She may return to work on 07/21/2025.       If you have any questions or concerns, please don't hesitate to call.      Aminta Davis M.D.

## 2025-07-17 ENCOUNTER — PHARMACY VISIT (OUTPATIENT)
Dept: PHARMACY | Facility: MEDICAL CENTER | Age: 45
End: 2025-07-17
Payer: COMMERCIAL

## 2025-07-17 ENCOUNTER — HOSPITAL ENCOUNTER (OUTPATIENT)
Dept: RADIOLOGY | Facility: MEDICAL CENTER | Age: 45
End: 2025-07-17
Attending: STUDENT IN AN ORGANIZED HEALTH CARE EDUCATION/TRAINING PROGRAM
Payer: COMMERCIAL

## 2025-07-17 VITALS
RESPIRATION RATE: 16 BRPM | OXYGEN SATURATION: 92 % | TEMPERATURE: 100.5 F | HEIGHT: 60 IN | WEIGHT: 110.45 LBS | BODY MASS INDEX: 21.68 KG/M2 | SYSTOLIC BLOOD PRESSURE: 121 MMHG | DIASTOLIC BLOOD PRESSURE: 71 MMHG | HEART RATE: 101 BPM

## 2025-07-17 PROCEDURE — RXMED WILLOW AMBULATORY MEDICATION CHARGE: Performed by: STUDENT IN AN ORGANIZED HEALTH CARE EDUCATION/TRAINING PROGRAM

## 2025-07-17 PROCEDURE — 71045 X-RAY EXAM CHEST 1 VIEW: CPT

## 2025-07-17 PROCEDURE — A9270 NON-COVERED ITEM OR SERVICE: HCPCS | Performed by: STUDENT IN AN ORGANIZED HEALTH CARE EDUCATION/TRAINING PROGRAM

## 2025-07-17 PROCEDURE — 700102 HCHG RX REV CODE 250 W/ 637 OVERRIDE(OP): Performed by: STUDENT IN AN ORGANIZED HEALTH CARE EDUCATION/TRAINING PROGRAM

## 2025-07-17 RX ORDER — IBUPROFEN 600 MG/1
600 TABLET, FILM COATED ORAL ONCE
Status: COMPLETED | OUTPATIENT
Start: 2025-07-17 | End: 2025-07-17

## 2025-07-17 RX ORDER — ONDANSETRON 4 MG/1
4 TABLET, ORALLY DISINTEGRATING ORAL EVERY 6 HOURS PRN
Qty: 10 TABLET | Refills: 0 | Status: SHIPPED | OUTPATIENT
Start: 2025-07-17

## 2025-07-17 RX ORDER — DOXYCYCLINE 100 MG/1
100 TABLET ORAL ONCE
Status: COMPLETED | OUTPATIENT
Start: 2025-07-17 | End: 2025-07-17

## 2025-07-17 RX ORDER — DOXYCYCLINE 100 MG/1
100 TABLET ORAL 2 TIMES DAILY
Qty: 10 TABLET | Refills: 0 | Status: ACTIVE | OUTPATIENT
Start: 2025-07-17 | End: 2025-07-22

## 2025-07-17 RX ADMIN — IBUPROFEN 600 MG: 600 TABLET, FILM COATED ORAL at 00:30

## 2025-07-17 RX ADMIN — DOXYCYCLINE 100 MG: 100 TABLET, FILM COATED ORAL at 00:30

## 2025-07-17 ASSESSMENT — PAIN DESCRIPTION - PAIN TYPE: TYPE: ACUTE PAIN

## 2025-07-17 NOTE — ED PROVIDER NOTES
ED Provider Note    CHIEF COMPLAINT  Chief Complaint   Patient presents with    Fever     X multiple days daughter (7yr old) had pneumonia community based a few weeks ago and thinks maybe now she has it     Flu Like Symptoms       EXTERNAL RECORDS REVIEWED  Outpatient Notes patient got outpatient cardiac stress test done which showed no acute myocardial perfusion abnormalities and her EF is 67%.  She had an echocardiogram done which showed normal EF    HPI/ROS  LIMITATION TO HISTORY   Select: : None  OUTSIDE HISTORIAN(S):  None    Annie Tierney is a 45 y.o. female with no chronic medical problems who presents for evaluation of fever and cough which has been ongoing since Monday.  Tmax at home 102.2.  She has a sometimes wet cough.  She denies any shortness of breath.  She has some nausea but denies vomiting or diarrhea.  She has sore throat from coughing so much.  She has no runny nose.  She is concerned as her daughter had community-acquired pneumonia earlier this month.  Patient has no chronic medical problems.  She takes no daily medications.    PAST MEDICAL HISTORY   has a past medical history of Anesthesia.    SURGICAL HISTORY   has a past surgical history that includes myringotomy (1985, 1998); reconstruction, knee, acl, arthroscopic (9/22/08); and meniscectomy (9/22/08).    FAMILY HISTORY  Family History   Problem Relation Age of Onset    Diabetes Maternal Grandmother     Heart Disease Maternal Grandmother     Hypertension Maternal Grandfather     Stroke Maternal Grandmother        SOCIAL HISTORY  Social History     Tobacco Use    Smoking status: Never    Smokeless tobacco: Never   Substance and Sexual Activity    Alcohol use: Never    Drug use: Never    Sexual activity: Not on file       CURRENT MEDICATIONS  Home Medications    **Home medications have not yet been reviewed for this encounter**         ALLERGIES  Allergies[1]    PHYSICAL EXAM  VITAL SIGNS: /72   Pulse 94   Temp 37.1 °C (98.8 °F)  (Temporal)   Resp 16   Ht 1.524 m (5')   Wt 50.1 kg (110 lb 7.2 oz)   SpO2 98%   BMI 21.57 kg/m²      Constitutional: Well developed, Well nourished, No acute respiratory distress, Non-toxic appearance.   HENT: Normocephalic, Atraumatic, Bilateral external ears normal, Oropharynx clear, mucous membranes are moist. Mildly erythematous posterior oropharynx  Eyes: Conjunctiva normal, No discharge. No icterus.  Cardiovascular: Normal heart rate, Normal rhythm, No murmurs, No rubs, No gallops.   Thorax & Lungs: Clear to auscultation bilaterally, No respiratory distress, No wheezing.  Abdomen: Soft nontender no rebound masses or peritoneal signs  Skin: Warm, Dry, No erythema, No rash.   Extremities: Intact distal pulses, No edema, No tenderness  Musculoskeletal: Good range of motion in all major joints. Normal gait.  Neurologic: Alert & oriented. No focal deficits noted.   Psych: Alert normal affect       EKG/LABS  COVID, influenza, RSV test negative  I have independently interpreted this EKG    RADIOLOGY/PROCEDURES   I have independently interpreted the diagnostic imaging associated with this visit and am waiting the final reading from the radiologist.   My preliminary interpretation is as follows: no pneumothorax    Radiologist interpretation:  DX-CHEST-PORTABLE (1 VIEW)   Final Result      Right infrahilar opacity, concerning for pneumonia.             COURSE & MEDICAL DECISION MAKING    ASSESSMENT, COURSE AND PLAN  Care Narrative:   This is a 45-year-old female who is otherwise healthy is presenting for evaluation of fever and cough for the past 2 days.  Daughter recently had community-acquired pneumonia.  Patient has no shortness of breath.  She has no upper respiratory symptoms.  On arrival, her vital signs are stable.  She did develop a fever and became mildly tachycardic however she has had a normal heart rate when she was afebrile.  Her COVID, flu, RSV test are negative.  Chest x-ray was done and shows right  infrahilar opacity which is concerning for pneumonia.  Will treat with a course of doxycycline given patient is otherwise healthy.  Did consider blood work however patient is well-appearing, she has no systemic signs of illness therefore we will hold off on blood work for now.  Patient is advised to return if she continues to fever for 48 hours or if she has any shortness of breath, vomiting or any other concerns.  Will prescribe her Zofran as well.  Patient is agreeable to discharge plan of no further questions.            ADDITIONAL PROBLEMS MANAGED  None    DISPOSITION AND DISCUSSIONS  I have discussed management of the patient with the following physicians and BOGDAN's:  None    Discussion of management with other [facility] or appropriate source(s): None     Escalation of care considered, and ultimately not performed:IV fluids and Laboratory analysis    Barriers to care at this time, including but not limited to: None.     Decision tools and prescription drugs considered including, but not limited to: Antibiotics doxycyline.     The patient will return for new or worsening symptoms and is stable at the time of discharge.    The patient is referred to a primary physician for blood pressure management, diabetic screening, and for all other preventative health concerns.      DISPOSITION:  Patient will be discharged home in stable condition.    FOLLOW UP:  Nagi Taveras D.O.  [address] [id number] [phone]          [facility], Emergency Dept  [address] [address] [phone]          OUTPATIENT MEDICATIONS:  Discharge Medication List as of 7/17/2025 12:37 AM        START taking these medications    Details   doxycycline monohydrate (ADOXA) 100 MG tablet Take 1 Tablet by mouth 2 times a day for 5 days.only have tablets in stock not capsuleDisp-10 Tablet, R-0, Normal      ondansetron (ZOFRAN ODT) 4 MG TABLET DISPERSIBLE Take 1 Tablet by mouth every 6 hours as  needed for Nausea/Vomiting., Disp-10 Tablet, R-0, Normal               FINAL DIAGNOSIS  1. Pneumonia of right lung due to infectious organism, unspecified part of lung Acute   2. Nausea Acute        Electronically signed by: Aminta Davis M.D., 7/16/2025 11:55 PM           [1]   Allergies  Allergen Reactions    Cefaclor Hives

## 2025-07-17 NOTE — ED NOTES
Patient medicated for pain / fever. Antibiotic given. Discharged with prescriptions and instruction. Verbalized understanding. Work note provided.

## 2025-07-17 NOTE — DISCHARGE INSTRUCTIONS
You are seen emergency room for evaluation of cough.  You are found to have a right sided pneumonia.  Will place you on a course of doxycycline.  Continue Tylenol and Motrin.  Return if your fever persists for 48 hours or if you have any worsening shortness of breath or any other concerns.

## 2025-07-17 NOTE — ED TRIAGE NOTES
Chief Complaint   Patient presents with    Fever     X multiple days daughter (7yr old) had pneumonia community based a few weeks ago and thinks maybe now she has it     Flu Like Symptoms     Patient ambulatory to triage with steady gait. Educated on triage process and wait times. Educated patient to update staff on any change in condition.     /72   Pulse 94   Temp 37.1 °C (98.8 °F) (Temporal)   Resp 16   Ht 1.524 m (5')   Wt 50.1 kg (110 lb 7.2 oz)   SpO2 98%   BMI 21.57 kg/m²

## 2025-07-29 ENCOUNTER — OFFICE VISIT (OUTPATIENT)
Dept: URGENT CARE | Facility: PHYSICIAN GROUP | Age: 45
End: 2025-07-29
Payer: COMMERCIAL

## 2025-07-29 ENCOUNTER — HOSPITAL ENCOUNTER (OUTPATIENT)
Dept: RADIOLOGY | Facility: MEDICAL CENTER | Age: 45
End: 2025-07-29
Attending: NURSE PRACTITIONER
Payer: COMMERCIAL

## 2025-07-29 VITALS
OXYGEN SATURATION: 95 % | HEIGHT: 60 IN | BODY MASS INDEX: 21.01 KG/M2 | DIASTOLIC BLOOD PRESSURE: 66 MMHG | WEIGHT: 107 LBS | SYSTOLIC BLOOD PRESSURE: 116 MMHG | TEMPERATURE: 97.2 F | RESPIRATION RATE: 14 BRPM | HEART RATE: 103 BPM

## 2025-07-29 DIAGNOSIS — R05.9 COUGH, UNSPECIFIED TYPE: Primary | ICD-10-CM

## 2025-07-29 DIAGNOSIS — R05.9 COUGH, UNSPECIFIED TYPE: ICD-10-CM

## 2025-07-29 DIAGNOSIS — J22 ACUTE RESPIRATORY INFECTION: ICD-10-CM

## 2025-07-29 PROCEDURE — 71046 X-RAY EXAM CHEST 2 VIEWS: CPT

## 2025-07-29 RX ORDER — ALBUTEROL SULFATE 90 UG/1
2 INHALANT RESPIRATORY (INHALATION) EVERY 6 HOURS PRN
Qty: 8.5 G | Refills: 0 | Status: SHIPPED | OUTPATIENT
Start: 2025-07-29

## 2025-07-29 ASSESSMENT — ENCOUNTER SYMPTOMS
EYE DISCHARGE: 0
SHORTNESS OF BREATH: 0
WHEEZING: 0
MYALGIAS: 0
ORTHOPNEA: 0
HEADACHES: 0
SORE THROAT: 0
DIARRHEA: 0
CHILLS: 0
SPUTUM PRODUCTION: 1
COUGH: 1
FEVER: 0
NAUSEA: 0

## 2025-07-30 NOTE — PROGRESS NOTES
Subjective     Annie Tierney is a 45 y.o. female who presents with Cough (Cough, chest tightness, x 1 month. )            HPI  New problem.  Patient is a 45-year-old female with past medical history of pneumonia diagnosed 2 weeks ago in the emergency department.  She completed a 7-day course of doxycycline and presents today with continued cough, chest tightness.  She does report that she thought she was getting better however since Saturday her symptoms have worsened.  She denies fever, chills, myalgia but does report the chest tightness.  She has been taking over-the-counter DayQuil for her symptoms.  She has noted to have a decrease in her oxygen saturation and she is tachycardic today.    Review of Systems   Constitutional:  Positive for malaise/fatigue. Negative for chills and fever.   HENT:  Positive for congestion. Negative for sore throat.    Eyes:  Negative for discharge.   Respiratory:  Positive for cough and sputum production. Negative for shortness of breath and wheezing.    Cardiovascular:  Negative for chest pain and orthopnea.   Gastrointestinal:  Negative for diarrhea and nausea.   Musculoskeletal:  Negative for myalgias.   Neurological:  Negative for headaches.   Endo/Heme/Allergies:  Negative for environmental allergies.   All other systems reviewed and are negative.             Objective     There were no vitals taken for this visit.     Physical Exam  Constitutional:       General: She is not in acute distress.     Appearance: She is well-developed. She is ill-appearing.   HENT:      Head: Normocephalic.      Right Ear: Tympanic membrane and external ear normal.      Left Ear: Tympanic membrane and external ear normal.      Nose: Mucosal edema and rhinorrhea present.      Mouth/Throat:      Pharynx: No posterior oropharyngeal erythema.   Eyes:      General:         Right eye: No discharge.         Left eye: No discharge.      Conjunctiva/sclera: Conjunctivae normal.   Cardiovascular:       Rate and Rhythm: Regular rhythm. Tachycardia present.      Heart sounds: Normal heart sounds.   Pulmonary:      Effort: Pulmonary effort is normal.      Breath sounds: Normal breath sounds. No wheezing or rales.   Musculoskeletal:         General: Normal range of motion.      Cervical back: Normal range of motion and neck supple.   Lymphadenopathy:      Cervical: No cervical adenopathy.   Skin:     General: Skin is warm and dry.   Neurological:      Mental Status: She is alert and oriented to person, place, and time.   Psychiatric:         Behavior: Behavior normal.         Thought Content: Thought content normal.                                  Assessment & Plan       1. Cough, unspecified type  DX-CHEST-2 VIEWS    albuterol 108 (90 Base) MCG/ACT Aero Soln inhalation aerosol      2. Acute respiratory infection          Patient with negative imaging.  Likely new viral illness.  Advised on symptom management, fluids and rest.   Differential diagnosis, natural history, supportive care, and indications for immediate follow-up were discussed.